# Patient Record
Sex: MALE | Race: AMERICAN INDIAN OR ALASKA NATIVE
[De-identification: names, ages, dates, MRNs, and addresses within clinical notes are randomized per-mention and may not be internally consistent; named-entity substitution may affect disease eponyms.]

---

## 2020-10-01 ENCOUNTER — HOSPITAL ENCOUNTER (EMERGENCY)
Dept: HOSPITAL 5 - ED | Age: 65
Discharge: HOME | End: 2020-10-01
Payer: SELF-PAY

## 2020-10-01 VITALS — SYSTOLIC BLOOD PRESSURE: 127 MMHG | DIASTOLIC BLOOD PRESSURE: 80 MMHG
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
## 2020-10-01 DIAGNOSIS — F17.200: ICD-10-CM
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
## 2020-10-01 DIAGNOSIS — Z79.899: ICD-10-CM

## 2020-10-01 DIAGNOSIS — S20.212A: Primary | ICD-10-CM

## 2020-10-01 DIAGNOSIS — Z98.890: ICD-10-CM

## 2020-10-01 PROCEDURE — 93005 ELECTROCARDIOGRAM TRACING: CPT
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
